# Patient Record
Sex: MALE | Race: WHITE | NOT HISPANIC OR LATINO | Employment: OTHER | URBAN - NONMETROPOLITAN AREA
[De-identification: names, ages, dates, MRNs, and addresses within clinical notes are randomized per-mention and may not be internally consistent; named-entity substitution may affect disease eponyms.]

---

## 2019-01-23 ENCOUNTER — TELEPHONE (OUTPATIENT)
Dept: OTOLARYNGOLOGY | Facility: CLINIC | Age: 84
End: 2019-01-23

## 2019-02-18 ENCOUNTER — OFFICE VISIT (OUTPATIENT)
Dept: OTOLARYNGOLOGY | Facility: CLINIC | Age: 84
End: 2019-02-18

## 2019-02-18 VITALS
RESPIRATION RATE: 20 BRPM | SYSTOLIC BLOOD PRESSURE: 139 MMHG | HEIGHT: 69 IN | WEIGHT: 241 LBS | HEART RATE: 74 BPM | DIASTOLIC BLOOD PRESSURE: 76 MMHG | TEMPERATURE: 98.2 F | BODY MASS INDEX: 35.7 KG/M2

## 2019-02-18 DIAGNOSIS — H92.03 OTALGIA OF BOTH EARS: ICD-10-CM

## 2019-02-18 DIAGNOSIS — H91.93 BILATERAL HEARING LOSS, UNSPECIFIED HEARING LOSS TYPE: ICD-10-CM

## 2019-02-18 DIAGNOSIS — R59.0 LYMPHADENOPATHY OF RIGHT CERVICAL REGION: Primary | ICD-10-CM

## 2019-02-18 DIAGNOSIS — Z85.820 HISTORY OF MALIGNANT MELANOMA: ICD-10-CM

## 2019-02-18 PROCEDURE — 99203 OFFICE O/P NEW LOW 30 MIN: CPT | Performed by: OTOLARYNGOLOGY

## 2019-02-18 RX ORDER — ESOMEPRAZOLE MAGNESIUM 40 MG/1
40 CAPSULE, DELAYED RELEASE ORAL DAILY
COMMUNITY

## 2019-02-18 NOTE — PROGRESS NOTES
PRIMARY CARE PROVIDER: Provider, No Known  REFERRING PROVIDER: No ref. provider found    Chief Complaint   Patient presents with   • Neck Pain     Swollen Lymph Node Right Neck       Subjective   History of Present Illness:  Miguelangel Espinal is a  83 y.o. male who presents with complaints of a swollen lymph node in his right neck.  This is been present for 6-8 months.  He reports paroxysmal, mild pain in the area.  He also has a long-standing history of otalgia and decreased hearing.  He does have a 20 pound weight loss over the past year, but attributes this to knee surgery.  He has a history of melanoma removed from his back 6-8 months ago.  He is also had skin cancers removed from the right preauricular area and from his forehead and scalp.  Nothing is made his lymph node better or worse.      Review of Systems:  Review of Systems   Constitutional: Positive for unexpected weight change. Negative for chills and fever.   HENT: Positive for ear pain and hearing loss. Negative for facial swelling.    Musculoskeletal: Positive for neck pain (cervical disease).   Skin: Negative for color change and wound.   Neurological: Negative for facial asymmetry and headaches.   Hematological: Positive for adenopathy. Does not bruise/bleed easily.   Psychiatric/Behavioral: Negative for agitation.       Past History:  Past Medical History:   Diagnosis Date   • BCC (basal cell carcinoma)     scalp   • Melanoma (CMS/HCC)     back     Past Surgical History:   Procedure Laterality Date   • GALLBLADDER SURGERY     • REPLACEMENT TOTAL KNEE BILATERAL     • SKIN CANCER EXCISION      scalp   • SKIN CANCER EXCISION      melanoma back     History reviewed. No pertinent family history.  Social History     Tobacco Use   • Smoking status: Never Smoker   • Smokeless tobacco: Never Used   Substance Use Topics   • Alcohol use: No     Frequency: Never   • Drug use: Not on file     Allergies:  Cephalexin; Penicillin g; and Tetracycline    Current  Outpatient Medications:   •  esomeprazole (NEXIUM) 40 MG capsule, Take 40 mg by mouth Daily., Disp: , Rfl:       Objective     Vital Signs:  Temp:  [98.2 °F (36.8 °C)] 98.2 °F (36.8 °C)  Heart Rate:  [74] 74  Resp:  [20] 20  BP: (139)/(76) 139/76    Physical Exam:  Physical Exam   Constitutional: He is oriented to person, place, and time. He appears well-developed and well-nourished. He is cooperative. No distress.   HENT:   Head: Normocephalic and atraumatic.       Right Ear: External ear normal.   Left Ear: External ear normal.   Nose: Nose normal.   Mouth/Throat: Oropharynx is clear and moist.   Eyes: Conjunctivae and EOM are normal. Pupils are equal, round, and reactive to light. Right eye exhibits no discharge. Left eye exhibits no discharge. No scleral icterus.   Neck: Normal range of motion. Neck supple. No JVD present. No tracheal deviation present. No thyromegaly present.   Pulmonary/Chest: Effort normal. No stridor.   Musculoskeletal: Normal range of motion. He exhibits no edema or deformity.   Lymphadenopathy:     He has no cervical adenopathy.   Neurological: He is alert and oriented to person, place, and time. He has normal strength. No cranial nerve deficit. Coordination normal.   Skin: Skin is warm and dry. No rash noted. He is not diaphoretic. No erythema. No pallor.   Psychiatric: He has a normal mood and affect. His speech is normal and behavior is normal. Judgment and thought content normal. Cognition and memory are normal.   Nursing note and vitals reviewed.      Results Review:   According to Dr. 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 Lymphadenopathy of right cervical region    2. History of malignant melanoma    3. Otalgia of both ears    4. Bilateral hearing loss, unspecified hearing loss type        Plan   Plan:        We will proceed with an ultrasound-guided fine-needle aspiration.  Should this demonstrate melanoma, we will likely  proceed with a PET scan.  Follow-up in approximately 2 weeks to review the results.    Return in about 2 weeks (around 3/4/2019).    My findings and recommendations were discussed and questions were answered.     Ruslan Franco MD  02/18/19  2:07 PM

## 2019-02-27 DIAGNOSIS — R59.1 LYMPHADENOPATHY OF HEAD AND NECK: Primary | ICD-10-CM

## 2019-03-05 ENCOUNTER — HOSPITAL ENCOUNTER (OUTPATIENT)
Dept: ULTRASOUND IMAGING | Facility: HOSPITAL | Age: 84
Discharge: HOME | End: 2019-03-05
Admitting: OTOLARYNGOLOGY

## 2019-03-05 DIAGNOSIS — R59.1 LYMPHADENOPATHY OF HEAD AND NECK: ICD-10-CM

## 2019-03-05 PROCEDURE — 76536 US EXAM OF HEAD AND NECK: CPT

## 2019-03-13 ENCOUNTER — OFFICE VISIT (OUTPATIENT)
Dept: OTOLARYNGOLOGY | Facility: CLINIC | Age: 84
End: 2019-03-13

## 2019-03-13 VITALS
HEART RATE: 67 BPM | TEMPERATURE: 98 F | SYSTOLIC BLOOD PRESSURE: 145 MMHG | DIASTOLIC BLOOD PRESSURE: 80 MMHG | RESPIRATION RATE: 20 BRPM | BODY MASS INDEX: 35.7 KG/M2 | WEIGHT: 241 LBS | HEIGHT: 69 IN

## 2019-03-13 DIAGNOSIS — Z85.820 HISTORY OF MALIGNANT MELANOMA: ICD-10-CM

## 2019-03-13 DIAGNOSIS — H91.93 BILATERAL HEARING LOSS, UNSPECIFIED HEARING LOSS TYPE: ICD-10-CM

## 2019-03-13 DIAGNOSIS — H92.03 OTALGIA OF BOTH EARS: ICD-10-CM

## 2019-03-13 DIAGNOSIS — D17.0 LIPOMA OF NECK: Primary | ICD-10-CM

## 2019-03-13 PROCEDURE — 99213 OFFICE O/P EST LOW 20 MIN: CPT | Performed by: OTOLARYNGOLOGY

## 2019-03-13 NOTE — PROGRESS NOTES
PRIMARY CARE PROVIDER: Provider, No Known  REFERRING PROVIDER: No ref. provider found    Chief Complaint   Patient presents with   • Follow-up     FNA results       Subjective   History of Present Illness:  Miguelangel Espinal is a  83 y.o. male who is here to follow-up from complaints of a swollen lymph node in his right neck.  This has been present for 6-8 months.  He reports paroxysmal, mild pain in the area.  He also has a long-standing history of otalgia and decreased hearing.  He does have a 20 pound weight loss over the past year, but attributes this to knee surgery.  He has a history of melanoma removed from his back 6-8 months ago.  He is also had skin cancers removed from the right preauricular area and from his forehead and scalp.  Nothing is made his lymph node better or worse. An US guided FNA of the lymph node was ordered. However, the radiologist felt this was a lipoma and a biopsy was not performed.     Review of Systems:  Review of Systems   Constitutional: Positive for unexpected weight change. Negative for chills and fever.   HENT: Positive for ear pain and hearing loss. Negative for facial swelling.    Musculoskeletal: Positive for neck pain (cervical disease).   Skin: Negative for color change and wound.   Neurological: Negative for facial asymmetry and headaches.   Hematological: Positive for adenopathy. Does not bruise/bleed easily.   Psychiatric/Behavioral: Negative for agitation.       Past History:  Past Medical History:   Diagnosis Date   • BCC (basal cell carcinoma)     scalp   • Melanoma (CMS/HCC)     back     Past Surgical History:   Procedure Laterality Date   • GALLBLADDER SURGERY     • REPLACEMENT TOTAL KNEE BILATERAL     • SKIN CANCER EXCISION      scalp   • SKIN CANCER EXCISION      melanoma back     History reviewed. No pertinent family history.  Social History     Tobacco Use   • Smoking status: Never Smoker   • Smokeless tobacco: Never Used   Substance Use Topics   • Alcohol use: No      Frequency: Never   • Drug use: Not on file     Allergies:  Cephalexin; Penicillin g; and Tetracycline    Current Outpatient Medications:   •  esomeprazole (NEXIUM) 40 MG capsule, Take 40 mg by mouth Daily., Disp: , Rfl:       Objective     Vital Signs:  Temp:  [98 °F (36.7 °C)] 98 °F (36.7 °C)  Heart Rate:  [67] 67  Resp:  [20] 20  BP: (145)/(80) 145/80    Physical Exam:  Physical Exam   Constitutional: He is oriented to person, place, and time. He appears well-developed and well-nourished. He is cooperative. No distress.   HENT:   Head: Normocephalic and atraumatic.       Right Ear: External ear normal.   Left Ear: External ear normal.   Nose: Nose normal.   Mouth/Throat: Oropharynx is clear and moist.   Eyes: Conjunctivae and EOM are normal. Pupils are equal, round, and reactive to light. Right eye exhibits no discharge. Left eye exhibits no discharge. No scleral icterus.   Neck: Normal range of motion. Neck supple. No JVD present. No tracheal deviation present. No thyromegaly present.   Pulmonary/Chest: Effort normal. No stridor.   Musculoskeletal: Normal range of motion. He exhibits no edema or deformity.   Lymphadenopathy:     He has no cervical adenopathy.   Neurological: He is alert and oriented to person, place, and time. He has normal strength. No cranial nerve deficit. Coordination normal.   Skin: Skin is warm and dry. No rash noted. He is not diaphoretic. No erythema. No pallor.   Psychiatric: He has a normal mood and affect. His speech is normal and behavior is normal. Judgment and thought content normal. Cognition and memory are normal.   Nursing note and vitals reviewed.      Results Review:   According to Dr. 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    Assessment   Assessment:  1. Lipoma of neck    2. History of malignant melanoma    3. Otalgia of both ears    4. Bilateral  hearing loss, unspecified hearing loss type        Plan   Plan:    During US examination, this was felt to be a lipoma which is benign. There is no need to proceed with FNA.  He agrees with this plan.  I have offered to follow-up with him to monitor this area. He prefers to follow-up as needed. He was instructed to call or return should he notice any changes, especially enlargement. The patient has agreed and verbalized understanding.     QUALITY MEASURES    Body Mass Index Screening and Follow-Up Plan  Body mass index is 35.59 kg/m².  Patient's Body mass index is 35.59 kg/m². BMI is above normal parameters. Recommendations include: referral to primary care.    Tobacco Use: Screening and Cessation Intervention  Social History    Tobacco Use      Smoking status: Never Smoker      Smokeless tobacco: Never Used      Return if symptoms worsen or fail to improve, for Recheck.    My findings and recommendations were discussed and questions were answered.     Ruslan Franco MD  03/13/19  3:15 PM

## 2023-08-21 ENCOUNTER — OFFICE VISIT (OUTPATIENT)
Dept: OTOLARYNGOLOGY | Facility: CLINIC | Age: 88
End: 2023-08-21
Payer: MEDICARE

## 2023-08-21 VITALS
HEIGHT: 69 IN | BODY MASS INDEX: 31.1 KG/M2 | SYSTOLIC BLOOD PRESSURE: 144 MMHG | TEMPERATURE: 98 F | RESPIRATION RATE: 20 BRPM | WEIGHT: 210 LBS | HEART RATE: 74 BPM | DIASTOLIC BLOOD PRESSURE: 73 MMHG

## 2023-08-21 DIAGNOSIS — R22.1 NECK MASS: ICD-10-CM

## 2023-08-21 DIAGNOSIS — D17.0 LIPOMA OF NECK: Primary | ICD-10-CM

## 2023-08-21 PROCEDURE — 99203 OFFICE O/P NEW LOW 30 MIN: CPT | Performed by: OTOLARYNGOLOGY

## 2023-08-21 RX ORDER — AMLODIPINE BESYLATE 5 MG/1
5 TABLET ORAL DAILY
COMMUNITY
Start: 2023-07-18

## 2023-08-21 NOTE — PROGRESS NOTES
PRIMARY CARE PROVIDER: Kendrick Christina MD  REFERRING PROVIDER: No ref. provider found    Chief Complaint   Patient presents with    Skin Lesion     Lymph nodes right        Subjective   History of Present Illness:  Miguelangel Espinal is a  87 y.o. male who is here for reevaluation of a swollen lymph node in his right neck.  This has been present for 4 years and has not changed.  He reports paroxysmal, mild pain in the area.  He has a history of melanoma removed from his back 4.5 years ago.  He also had skin cancers removed from the right preauricular area and from his forehead and scalp.  Nothing is made his lymph node better or worse. An US guided FNA of the lymph node was ordered. However, the radiologist felt this was a lipoma and a biopsy was not performed. Dr. Schmid would like this mass evaluated.  He reports no change in the lesion over the past 4 years.    Review of Systems:  Review of Systems   Constitutional:  Positive for unexpected weight change. Negative for chills and fever.   HENT:  Positive for ear pain and hearing loss. Negative for facial swelling.    Musculoskeletal:  Positive for neck pain (cervical disease).   Skin:  Negative for color change and wound.   Neurological:  Negative for facial asymmetry and headaches.   Hematological:  Positive for adenopathy. Does not bruise/bleed easily.   Psychiatric/Behavioral:  Negative for agitation.      Past History:  Past Medical History:   Diagnosis Date    BCC (basal cell carcinoma)     scalp    Melanoma     back     Past Surgical History:   Procedure Laterality Date    GALLBLADDER SURGERY      REPLACEMENT TOTAL KNEE BILATERAL      SKIN CANCER EXCISION      scalp    SKIN CANCER EXCISION      melanoma back     History reviewed. No pertinent family history.  Social History     Tobacco Use    Smoking status: Never    Smokeless tobacco: Never   Substance Use Topics    Alcohol use: No     Allergies:  Cephalexin, Penicillin g, and Tetracycline    Current  Outpatient Medications:     amLODIPine (NORVASC) 5 MG tablet, Take 1 tablet by mouth Daily., Disp: , Rfl:     esomeprazole (nexIUM) 40 MG capsule, Take 1 capsule by mouth Daily., Disp: , Rfl:       Objective     Vital Signs:  Temp:  [98 øF (36.7 øC)] 98 øF (36.7 øC)  Heart Rate:  [74] 74  Resp:  [20] 20  BP: (144)/(73) 144/73    Physical Exam:  Physical Exam   Constitutional: He is oriented to person, place, and time. He appears well-developed. He is cooperative. No distress.   HENT:   Head: Normocephalic and atraumatic.       Right Ear: External ear normal.   Left Ear: External ear normal.   Nose: Nose normal.   Eyes: Pupils are equal, round, and reactive to light. Conjunctivae are normal. Right eye exhibits no discharge. Left eye exhibits no discharge. No scleral icterus.   Neck: No JVD present. No tracheal deviation present. No thyromegaly present.   Pulmonary/Chest: Effort normal. No stridor.   Musculoskeletal: Normal range of motion. No deformity.   Lymphadenopathy:     He has no cervical adenopathy.   Neurological: He is alert and oriented to person, place, and time. No cranial nerve deficit. Coordination normal.   Skin: Skin is warm and dry. No rash noted. He is not diaphoretic. No erythema. No pallor.   Psychiatric: His speech is normal and behavior is normal. Judgment and thought content normal.   Nursing note and vitals reviewed.    Results Review:   According to Dr. 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    Assessment   Assessment:  1. Lipoma of neck    2. Neck mass          Plan   Plan:    I would like to proceed with a CT scan of the neck to further evaluate.  This does not palpate like a lipoma.  I would be surprised if this were melanoma in the node, as his original melanoma was in situ and was of the mid back and this mass has not changed in 4 years.    He has been seeing   Case every 6 months.    QUALITY MEASURES    Body Mass Index Screening and Follow-Up Plan  Body mass index is 31.01 kg/mý.  Patient's Body mass index is 31.01 kg/mý. BMI is above normal parameters. Recommendations include: referral to primary care.    Tobacco Use: Screening and Cessation Intervention  Social History    Tobacco Use      Smoking status: Never      Smokeless tobacco: Never      Return in about 3 weeks (around 9/11/2023).    My findings and recommendations were discussed and questions were answered.     Ruslan Franco MD  08/21/23  15:58 CDT

## 2023-08-30 DIAGNOSIS — R22.1 NECK MASS: Primary | ICD-10-CM

## 2023-09-11 ENCOUNTER — TELEPHONE (OUTPATIENT)
Dept: OTOLARYNGOLOGY | Facility: CLINIC | Age: 88
End: 2023-09-11

## 2023-09-11 NOTE — TELEPHONE ENCOUNTER
Caller: Miguelangel Espinal     Relationship: SELF    Best call back number: [phone]    What is the best time to reach you: ANYTIME     Who are you requesting to speak with (clinical staff, provider,  specific staff member): CLINICAL FOR DR. RUSH    What was the call regarding: PT CALLED ASKING IF DR. RUSH WANTED HIM TO HAVE A CT SCAN DONE. HE WOULD LIKE A CALL BACK TO CLARIFY.

## 2023-09-12 ENCOUNTER — TELEPHONE (OUTPATIENT)
Dept: OTOLARYNGOLOGY | Facility: CLINIC | Age: 88
End: 2023-09-12

## 2023-09-12 ENCOUNTER — TELEPHONE (OUTPATIENT)
Dept: OTOLARYNGOLOGY | Facility: CLINIC | Age: 88
End: 2023-09-12
Payer: MEDICARE

## 2023-09-12 NOTE — TELEPHONE ENCOUNTER
This nurse called to inform him that I faxed over the CT order and that they will have to call the hospital to schedule and that they will need to bring a disk for the provider to look at it.  The son voiced understanding.

## 2023-09-12 NOTE — TELEPHONE ENCOUNTER
Hub staff attempted to follow warm transfer process and was unsuccessful     Caller: KIMBERLEY ONOFRE JR    Relationship to patient: SON    Best call back number: [phone]    Patient is needing: PT SON NEEDS TO R/S PT'S APPT THAT IS FOR TOMORROW SINCE HIS CT SCAN IS SCHEDULED FOR 9/18, PLEASE CALL BACK PT SON TO RESCHEDULE SO HIS APPT IS AFTER HIS SCHEDULED CT SCAN.

## 2023-09-13 ENCOUNTER — TELEPHONE (OUTPATIENT)
Dept: OTOLARYNGOLOGY | Facility: CLINIC | Age: 88
End: 2023-09-13

## 2023-09-19 ENCOUNTER — TELEPHONE (OUTPATIENT)
Dept: OTOLARYNGOLOGY | Facility: CLINIC | Age: 88
End: 2023-09-19
Payer: MEDICARE

## 2023-09-19 ENCOUNTER — TELEPHONE (OUTPATIENT)
Dept: OTOLARYNGOLOGY | Facility: CLINIC | Age: 88
End: 2023-09-19

## 2023-09-19 NOTE — TELEPHONE ENCOUNTER
[facility] received a fax that requires your attention. The document has been indexed to the patient’s chart for your review.      Reason for sending: RCVD POC CREATININE, REQUIRES PROVIDER REVIEW    Documents Description: LABS-[facility]-09.18.23    Name of Sender: [facility]    Date Indexed: 09.19.23    Notes (if needed):

## 2023-09-19 NOTE — TELEPHONE ENCOUNTER
Provider: DR. POPEYE RUSH    [facility] received a fax that requires your attention. The document has been indexed to the patient's chart for your review.   Reason for sending: REVIEW   Documents Description: CT NECK WITH CONTRAST 9/18/23   Name of Sender: [facility]   Date Indexed: 09/19/23

## 2023-09-25 ENCOUNTER — OFFICE VISIT (OUTPATIENT)
Dept: OTOLARYNGOLOGY | Facility: CLINIC | Age: 88
End: 2023-09-25

## 2023-09-25 VITALS
WEIGHT: 210 LBS | RESPIRATION RATE: 20 BRPM | DIASTOLIC BLOOD PRESSURE: 82 MMHG | HEIGHT: 69 IN | TEMPERATURE: 98 F | HEART RATE: 85 BPM | BODY MASS INDEX: 31.1 KG/M2 | SYSTOLIC BLOOD PRESSURE: 136 MMHG

## 2023-09-25 DIAGNOSIS — H61.23 BILATERAL IMPACTED CERUMEN: ICD-10-CM

## 2023-09-25 DIAGNOSIS — D17.0 LIPOMA OF NECK: ICD-10-CM

## 2023-09-25 DIAGNOSIS — R22.1 NECK MASS: Primary | ICD-10-CM

## 2023-09-25 NOTE — PROGRESS NOTES
PRIMARY CARE PROVIDER: Kendrick Christina MD  REFERRING PROVIDER: No ref. provider found    Chief Complaint   Patient presents with    Follow-up     CT results       Subjective   History of Present Illness:  Miguelangel Espinal is a  88 y.o. male who return for CT scan review regarding a swollen lymph node in his right neck.  This has been present for 4 years and has not changed.  He reports paroxysmal, mild pain in the area.  He has a history of melanoma removed from his back 4.5 years ago.  He also had skin cancers removed from the right preauricular area and from his forehead and scalp.  Nothing is made his lymph node better or worse. An US guided FNA of the lymph node was ordered. However, the radiologist felt this was a lipoma and a biopsy was not performed. Dr. Schmid would like this mass evaluated.  He reports no change in the lesion over the past 4 years.    He also reports some pain posterior to the left ear that radiates up his scalp on the left.    Review of Systems:  Review of Systems   Constitutional:  Positive for unexpected weight change. Negative for chills and fever.   HENT:  Positive for ear pain and hearing loss. Negative for facial swelling.    Musculoskeletal:  Positive for neck pain (cervical disease).   Skin:  Negative for color change and wound.   Neurological:  Negative for facial asymmetry and headaches.   Hematological:  Positive for adenopathy. Does not bruise/bleed easily.   Psychiatric/Behavioral:  Negative for agitation.      Past History:  Past Medical History:   Diagnosis Date    BCC (basal cell carcinoma)     scalp    Melanoma     back     Past Surgical History:   Procedure Laterality Date    GALLBLADDER SURGERY      REPLACEMENT TOTAL KNEE BILATERAL      SKIN CANCER EXCISION      scalp    SKIN CANCER EXCISION      melanoma back     History reviewed. No pertinent family history.  Social History     Tobacco Use    Smoking status: Never    Smokeless tobacco: Never   Substance Use  Topics    Alcohol use: No     Allergies:  Cephalexin, Penicillin g, and Tetracycline    Current Outpatient Medications:     amLODIPine (NORVASC) 5 MG tablet, Take 1 tablet by mouth Daily., Disp: , Rfl:     esomeprazole (nexIUM) 40 MG capsule, Take 1 capsule by mouth Daily., Disp: , Rfl:       Objective     Vital Signs:  Temp:  [98 °F (36.7 °C)] 98 °F (36.7 °C)  Heart Rate:  [85] 85  Resp:  [20] 20  BP: (136)/(82) 136/82    Physical Exam:  Physical Exam   Constitutional: He is oriented to person, place, and time. He appears well-developed. He is cooperative. No distress.   HENT:   Head: Normocephalic and atraumatic.       Right Ear: External ear normal. Decreased hearing is noted.   Left Ear: Decreased hearing is noted.   Nose: Nose normal.   Mouth/Throat: Mucous membranes are moist. He has dentures (Upper). No oral lesions. No tonsillar exudate. Oropharynx is clear.   Eyes: Pupils are equal, round, and reactive to light. Conjunctivae are normal. Right eye exhibits no discharge. Left eye exhibits no discharge. No scleral icterus.   Neck: No JVD present. No tracheal deviation present. No thyromegaly present.   Pulmonary/Chest: Effort normal. No stridor.   Musculoskeletal: Normal range of motion. No deformity.   Lymphadenopathy:     He has no cervical adenopathy.   Neurological: He is alert and oriented to person, place, and time. No cranial nerve deficit. Coordination normal.   Skin: Skin is warm and dry. No rash noted. He is not diaphoretic. No erythema. No pallor.   Psychiatric: His speech is normal and behavior is normal. Judgment and thought content normal.   Nursing note and vitals reviewed.    Results Review:   According to Dr. 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    I personally reviewed the CT scan images on disc.  The following is my interpretation: There is a fiducial overlying the  area of question.  I do not see anything suspicious for a lymph node in this area.  I feel this most likely represents a lipoma:        Assessment   Assessment:  1. Neck mass    2. Lipoma of neck    3. Bilateral impacted cerumen            Plan   Plan:    I discussed observation versus excision of the right neck mass for full pathological analysis.  He is accompanied by his son, who is also present serving as a historian and decision maker.  They are okay going ahead and scheduling this for excision.  We will schedule this for the morning time, should this represent a lymph node that needs to be sent for flow studies.    He had bilateral cerumen impaction.  This was removed today in the office.    QUALITY MEASURES    Body Mass Index Screening and Follow-Up Plan  Body mass index is 31.01 kg/m².  Patient's Body mass index is 31.01 kg/m². BMI is above normal parameters. Recommendations include: referral to primary care.    Tobacco Use: Screening and Cessation Intervention  Social History    Tobacco Use      Smoking status: Never      Smokeless tobacco: Never      No follow-ups on file.    My findings and recommendations were discussed and questions were answered.     Ruslan Franco MD  09/25/23  16:45 CDT

## 2023-09-25 NOTE — PROGRESS NOTES
PATIENT NAME:  Miguelangel Espinal    DATE:  09/25/23    PREOPERATIVE DIAGNOSIS:  Cerumen impaction, bilateral    POSTOPERATIVE DIAGNOSIS:  Cerumen impaction, bilateral    PROCEDURE: Binocular microscopy with cerumen removal, lateral    SURGEON:  Ruslan Franco MD, FACS    FACILITY: [facility] Office Procedure Room    ANESTHESIA:  None    DICTATED BY:  Ruslan Franco MD, FACS    IVF: None    IMPLANTS: None    DRAINS: None    SPECIMENS: None    EBL: None    COMPLICATIONS: None    INDICATIONS FOR SURGERY: The patient presented with a cerumen impaction    OPERATIVE FINDINGS: After removal of the impacted cerumen, the bilateral tympanic membranes appeared healthy without evidence of effusion or tympanic membrane perforation.    OPERATIVE DETAILS: The patient sat in the procedure room chair.  The binocular microscope was used to visualize the right external auditory canal.   Cerumen was removed utilizing alligator forceps and a 7 Caes suction.   Visualization demonstrated an intact tympanic membrane    An Identical procedure was performed on the left.  Visualization demonstrated impacted cerumen.  After removal, the tympanic membrane was visualized and noted to be intact without evidence of perforation or middle ear effusion.      DISPOSITION:  The procedures were completed without complication and tolerated well.  The patient was released in the company of his son to return home in satisfactory condition.  A follow-up appointment has been scheduled, routine post-op medications prescribed (if required), and post-procedure instructions were given to the responsible party.           Ruslan Franco MD, FACS  Board Certified Facial Plastic and Reconstructive Surgery  Board Certified Otolaryngology -- Head and Neck Surgery    Electronically signed by Ruslan Franco MD, 09/25/23, 4:47 PM CDT.

## 2023-11-08 ENCOUNTER — PROCEDURE VISIT (OUTPATIENT)
Age: 88
End: 2023-11-08
Payer: MEDICARE

## 2023-11-08 VITALS
HEART RATE: 76 BPM | DIASTOLIC BLOOD PRESSURE: 70 MMHG | SYSTOLIC BLOOD PRESSURE: 132 MMHG | TEMPERATURE: 98 F | HEIGHT: 69 IN | RESPIRATION RATE: 20 BRPM | BODY MASS INDEX: 31.1 KG/M2 | WEIGHT: 210 LBS

## 2023-11-08 DIAGNOSIS — D17.0 LIPOMA OF NECK: Primary | ICD-10-CM

## 2023-11-08 NOTE — PROGRESS NOTES
PATIENT NAME:  Miguelangel Espinal    DATE: 11/08/23    PREOPERATIVE DIAGNOSIS: Mass of right lateral neck    POSTOPERATIVE DIAGNOSIS: Lipoma of right lateral neck    PROCEDURE: Excision of lipoma, 1.0 centimeters, subcutaneous    SURGEON:  Ruslan Franco MD, FACS    FACILITY: [facility] Office Procedure Room    ANESTHESIA:  Local with 2 cc 1% lidocaine and 1:100,000 epinephrine    DICTATED BY:  Ruslan Franco MD, FACS    IVF: None    IMPLANTS: None    DRAINS: None    SPECIMENS: Lipoma    EBL: 20 cc    COMPLICATIONS: None apparent    INDICATIONS FOR SURGERY: Mr. Espinal presented with a mass of the right neck.  He was referred by his oncologist, who wanted definitive diagnosis.  Clinically, this was suspicious for a lipoma.  The patient opted for excision for removal and pathological diagnosis.    OPERATIVE FINDINGS: The palpable mass demonstrated an immediate-subcutaneous lipoma.  This measured 1.0 cm, but was removed with some normal-appearing fat.    OPERATIVE DETAILS:       After patient verification consent material was reviewed, the patient was taken to the procedure room and laid supine on the procedure table.  The skin at the area was cleansed with alcohol, the area marked, the patient was then handed a mirror where we reviewed the intended excision, and verified the consent.  This served as the formal timeout procedure.  The skin was  infiltrated with 1% lidocaine with 1-100,000 epinephrine.    After approximately 15 minutes, the skin was tested for anesthesia and deemed appropriate.  The skin was cleansed with Hibiclense and the patient was draped with sterile towels.    #15 blade was used to make a linear incision overlying the lipoma.  The skin was retracted with skin hooks and dissection was carried into the subcutaneous tissue utilizing curved iris scissors and Bovie cautery set at 12.   The mass was identified and noted to be compatible with a lipoma.  This was removed with some  normal-palpating fat from the deep aspect.  Hemostasis was obtained with bipolar cautery set at 15.    The dermis was closed utilizing 4-0 undyed Vicryl in buried fashion.  The skin was then closed utilizing running, locking 5-0 Prolene    DISPOSITION:  The procedures were completed without complication and tolerated well.  The patient was released in the company of himself to return home in satisfactory condition.  A follow-up appointment has been scheduled, routine post-op medications prescribed (if required), and post-op instructions were given to the responsible party.           Ruslan Franco MD, FACS  Board Certified Facial Plastic and Reconstructive Surgery  Board Certified Otolaryngology -- Head and Neck     Electronically signed by Ruslan Franco MD, 11/08/23, 9:40 AM CST.

## 2023-11-08 NOTE — PATIENT INSTRUCTIONS
[facility], Post-Procedure Instructions:    Protect the incisions from sunlight. Sunlight to the incisions will cause permanent pigmentation to the incision line and make the incision more noticeable. After the incision has reepithelialized (typically 2-3 weeks after the procedure), you may begin to use sunscreen with an SPF of 15 or greater    For the first week after your procedure, apply a thin coat of Vaseline to the incision 3-4 times daily.  Starting the second week, use a silicone-based wound cream to the incisions to optimize the end result. Apply topically twice daily, or as directed, to help optimize wound healing and decrease redness.  Should the area need to be cleaned, gently apply peroxide on a Q-tip to the area.  Do not clean the area more than once daily with peroxide, as it can delay wound healing.    Avoid getting water on the surgical site for 3 days.  On day 4, you may briefly get the surgical site with clean water, but avoid soapy water to the area for 1 week.      Due to COVID-19, we have decreased the amount of postoperative checks to help prevent added exposure to the virus.  If you have any questions or concerns following her procedure, please give us a call.  We have the ability to schedule a video visit, phone visit, or follow-up visit to address any concerns, while decreasing your exposure to the hospital.  Many of your questions and concerns may be able to be answered over the phone.  Our direct line is [phone].    It is very important to continue routine skin checks with a dermatologist or your PCP every 6-12 months.        CONTACT INFORMATION:  The main office phone number is [phone]. For emergencies after hours and on weekends, this number will convert over to our answering service and the on call provider will answer. Please try to keep non emergent phone calls/ questions to office hours 9am-5pm Monday through Friday.     [facility]  As an alternative, you can  sign up and use the Epic MyChart system for more direct and quicker access for non emergent questions/ problems.  [facility] allows you to send messages to your doctor, view your test results, renew your prescriptions, schedule appointments, and more. To sign up, go to [email or web address].    If you have questions, you can email [email or web address] or call [phone] to talk to our [employer] staff. Remember, [employer] is NOT to be used for urgent needs. For medical emergencies, dial 911.

## 2023-11-13 LAB
CYTO UR: NORMAL
LAB AP CASE REPORT: NORMAL
LAB AP CLINICAL INFORMATION: NORMAL
Lab: NORMAL
PATH REPORT.FINAL DX SPEC: NORMAL
PATH REPORT.GROSS SPEC: NORMAL

## 2023-11-14 ENCOUNTER — OFFICE VISIT (OUTPATIENT)
Age: 88
End: 2023-11-14
Payer: MEDICARE

## 2023-11-14 VITALS
BODY MASS INDEX: 31.1 KG/M2 | WEIGHT: 210 LBS | HEIGHT: 69 IN | RESPIRATION RATE: 20 BRPM | DIASTOLIC BLOOD PRESSURE: 80 MMHG | TEMPERATURE: 98 F | HEART RATE: 85 BPM | SYSTOLIC BLOOD PRESSURE: 142 MMHG

## 2023-11-14 DIAGNOSIS — D17.30 FIBROLIPOMA OF SKIN: Primary | ICD-10-CM

## 2023-11-14 PROCEDURE — 1160F RVW MEDS BY RX/DR IN RCRD: CPT | Performed by: NURSE PRACTITIONER

## 2023-11-14 PROCEDURE — 99024 POSTOP FOLLOW-UP VISIT: CPT | Performed by: NURSE PRACTITIONER

## 2023-11-14 PROCEDURE — 1159F MED LIST DOCD IN RCRD: CPT | Performed by: NURSE PRACTITIONER

## 2023-11-14 PROCEDURE — 87147 CULTURE TYPE IMMUNOLOGIC: CPT | Performed by: NURSE PRACTITIONER

## 2023-11-14 PROCEDURE — 87186 SC STD MICRODIL/AGAR DIL: CPT | Performed by: NURSE PRACTITIONER

## 2023-11-14 PROCEDURE — 87205 SMEAR GRAM STAIN: CPT | Performed by: NURSE PRACTITIONER

## 2023-11-14 PROCEDURE — 87070 CULTURE OTHR SPECIMN AEROBIC: CPT | Performed by: NURSE PRACTITIONER

## 2023-11-14 RX ORDER — CLINDAMYCIN HYDROCHLORIDE 300 MG/1
300 CAPSULE ORAL 3 TIMES DAILY
Qty: 30 CAPSULE | Refills: 0 | Status: SHIPPED | OUTPATIENT
Start: 2023-11-14 | End: 2023-11-24

## 2023-11-14 NOTE — PROGRESS NOTES
CAMRYN Mae     PRIMARY CARE PROVIDER: Kendrick Christina MD  REFERRING PROVIDER: No ref. provider found    Chief Complaint   Patient presents with    Post-op     Suture removal post-op visit       Subjective   History of Present Illness:  Miguelangel Espinal is a  88 y.o. male  who presents for follow up s/p excision of a lipoma of the right lateral neck on 11/8/2023.  Postoperatively, he has had some tenderness to the surgical site.  He denies fever, chills, bleeding, or drainage.      Review of Systems:  Review of Systems   Constitutional:  Positive for unexpected weight change. Negative for chills and fever.   HENT:  Positive for ear pain and hearing loss. Negative for facial swelling.    Musculoskeletal:  Positive for neck pain (cervical disease).   Skin:  Negative for color change and wound.   Neurological:  Negative for facial asymmetry and headaches.   Hematological:  Positive for adenopathy. Does not bruise/bleed easily.   Psychiatric/Behavioral:  Negative for agitation.        Past History:  Past Medical History:   Diagnosis Date    BCC (basal cell carcinoma)     scalp    Melanoma     back     Past Surgical History:   Procedure Laterality Date    GALLBLADDER SURGERY      REPLACEMENT TOTAL KNEE BILATERAL      SKIN CANCER EXCISION      scalp    SKIN CANCER EXCISION      melanoma back     History reviewed. No pertinent family history.  Social History     Tobacco Use    Smoking status: Never    Smokeless tobacco: Never   Substance Use Topics    Alcohol use: No     Allergies:  Cephalexin, Penicillin g, and Tetracycline    Current Outpatient Medications:     amLODIPine (NORVASC) 5 MG tablet, Take 1 tablet by mouth Daily., Disp: , Rfl:     esomeprazole (nexIUM) 40 MG capsule, Take 1 capsule by mouth Daily., Disp: , Rfl:     clindamycin (CLEOCIN) 300 MG capsule, Take 1 capsule by mouth 3 (Three) Times a Day for 10 days., Disp: 30 capsule, Rfl: 0    mupirocin (BACTROBAN) 2 % ointment, Apply 1 application   topically to the appropriate area as directed 3 (Three) Times a Day for 7 days., Disp: 22 g, Rfl: 0      Objective     Vital Signs:  Temp:  [98 °F (36.7 °C)] 98 °F (36.7 °C)  Heart Rate:  [85] 85  Resp:  [20] 20  BP: (142)/(80) 142/80    Physical Exam:  Physical Exam  Vitals reviewed.   Constitutional:       General: He is not in acute distress.     Appearance: He is well-developed.   HENT:      Head: Normocephalic and atraumatic.      Right Ear: External ear normal.      Left Ear: External ear normal.      Mouth/Throat:      Lips: Pink.   Eyes:      General: Lids are normal.   Neck:     Pulmonary:      Effort: Pulmonary effort is normal. No respiratory distress.      Breath sounds: No stridor.   Musculoskeletal:      Cervical back: Neck supple.   Neurological:      Mental Status: He is alert and oriented to person, place, and time.   Psychiatric:         Mood and Affect: Mood normal.         Speech: Speech normal.         Behavior: Behavior normal. Behavior is cooperative.         Results Review:       Assessment   Assessment:  1. Fibrolipoma of skin        Plan   Plan:    Culture was obtained.  We will go ahead and start him on Bactroban and clindamycin.  He was instructed to call return should any problems arise prior to next office visit.      New Medications Ordered This Visit   Medications    mupirocin (BACTROBAN) 2 % ointment     Sig: Apply 1 application  topically to the appropriate area as directed 3 (Three) Times a Day for 7 days.     Dispense:  22 g     Refill:  0    clindamycin (CLEOCIN) 300 MG capsule     Sig: Take 1 capsule by mouth 3 (Three) Times a Day for 10 days.     Dispense:  30 capsule     Refill:  0     There are no Patient Instructions on file for this visit.  Return in about 4 weeks (around 12/12/2023), or if symptoms worsen or fail to improve, for Recheck.    My findings and recommendations were discussed and questions were answered.     Crystal Zambrano, APRN  11/14/23  13:02 CST

## 2023-11-17 LAB
BACTERIA SPEC AEROBE CULT: ABNORMAL
BACTERIA SPEC AEROBE CULT: ABNORMAL
GRAM STN SPEC: ABNORMAL
GRAM STN SPEC: ABNORMAL

## 2023-11-20 ENCOUNTER — TELEPHONE (OUTPATIENT)
Age: 88
End: 2023-11-20
Payer: MEDICARE

## 2023-11-21 RX ORDER — SULFAMETHOXAZOLE AND TRIMETHOPRIM 800; 160 MG/1; MG/1
1 TABLET ORAL 2 TIMES DAILY
Qty: 20 TABLET | Refills: 0 | Status: SHIPPED | OUTPATIENT
Start: 2023-11-21 | End: 2023-12-01

## 2023-11-21 NOTE — TELEPHONE ENCOUNTER
Pt was informed of the ABT change and is to let this nurse know if he has any issues with the new ABT.

## 2023-11-28 ENCOUNTER — TELEPHONE (OUTPATIENT)
Age: 88
End: 2023-11-28
Payer: MEDICARE

## 2023-11-28 NOTE — TELEPHONE ENCOUNTER
Returned call to pt regarding rescheduling appointment.  Pt voiced understanding of 12/18 appointment

## 2023-11-28 NOTE — TELEPHONE ENCOUNTER
Hub staff attempted to follow warm transfer process and was unsuccessful     Caller: KIMBERLEY ONOFRE    Relationship to patient: SELF    Best call back number: [phone]     Patient is needing: PT WAS RETURNING A CALL - APPEARS TO BE ABOUT RESCHEDULING APPT, BUT NO TE IN CHART. PLEASE CALL HIM BACK.

## 2023-12-18 ENCOUNTER — OFFICE VISIT (OUTPATIENT)
Age: 88
End: 2023-12-18
Payer: MEDICARE

## 2023-12-18 VITALS — DIASTOLIC BLOOD PRESSURE: 77 MMHG | HEART RATE: 80 BPM | TEMPERATURE: 97.6 F | SYSTOLIC BLOOD PRESSURE: 134 MMHG

## 2023-12-18 DIAGNOSIS — D17.30 FIBROLIPOMA OF SKIN: Primary | ICD-10-CM

## 2023-12-18 DIAGNOSIS — T81.30XA SPITTING SUTURE, INITIAL ENCOUNTER: ICD-10-CM

## 2023-12-18 NOTE — PROGRESS NOTES
CAMRYN Mae     PRIMARY CARE PROVIDER: Kendrick Christina MD  REFERRING PROVIDER: No ref. provider found    Chief Complaint   Patient presents with    Follow-up     1 month follow up on exc of lipoma to right neck       Subjective   History of Present Illness:  Miguelangel Espinal is a  88 y.o. male  who presents for follow up s/p excision of a lipoma of the right lateral neck on 11/8/2023.  Postoperatively, he had tenderness, erythema, and a scant amount of purulence to the incision.  On 11/14/2023, a culture was obtained and revealed light growth of Staphylococcus aureus susceptible to clindamycin.  He was treated with clindamycin and mupirocin.  He denies pain, fever, chills, bleeding, or drainage.      Review of Systems:  Review of Systems   Constitutional:  Positive for unexpected weight change. Negative for chills and fever.   HENT:  Positive for ear pain and hearing loss. Negative for facial swelling.    Musculoskeletal:  Positive for neck pain (cervical disease).   Skin:  Negative for color change and wound.   Neurological:  Negative for facial asymmetry and headaches.   Hematological:  Positive for adenopathy. Does not bruise/bleed easily.   Psychiatric/Behavioral:  Negative for agitation.        Past History:  Past Medical History:   Diagnosis Date    BCC (basal cell carcinoma)     scalp    Melanoma     back     Past Surgical History:   Procedure Laterality Date    GALLBLADDER SURGERY      REPLACEMENT TOTAL KNEE BILATERAL      SKIN CANCER EXCISION      scalp    SKIN CANCER EXCISION      melanoma back     History reviewed. No pertinent family history.  Social History     Tobacco Use    Smoking status: Never    Smokeless tobacco: Never   Substance Use Topics    Alcohol use: No     Allergies:  Clindamycin/lincomycin, Cephalexin, Penicillin g, and Tetracycline    Current Outpatient Medications:     amLODIPine (NORVASC) 5 MG tablet, Take 1 tablet by mouth Daily., Disp: , Rfl:     esomeprazole (nexIUM) 40  MG capsule, Take 1 capsule by mouth Daily., Disp: , Rfl:       Objective     Vital Signs:  Temp:  [97.6 °F (36.4 °C)] 97.6 °F (36.4 °C)  Heart Rate:  [80] 80  BP: (134)/(77) 134/77    Physical Exam:  Physical Exam  Vitals reviewed.   Constitutional:       General: He is not in acute distress.     Appearance: He is well-developed.   HENT:      Head: Normocephalic and atraumatic.      Right Ear: External ear normal.      Left Ear: External ear normal.      Mouth/Throat:      Lips: Pink.   Eyes:      General: Lids are normal.   Neck:     Pulmonary:      Effort: Pulmonary effort is normal. No respiratory distress.      Breath sounds: No stridor.   Musculoskeletal:      Cervical back: Neck supple.   Neurological:      Mental Status: He is alert and oriented to person, place, and time.   Psychiatric:         Mood and Affect: Mood normal.         Speech: Speech normal.         Behavior: Behavior normal. Behavior is cooperative.         Results Review:               Assessment   Assessment:  1. Fibrolipoma of skin    2. Spitting suture, initial encounter          Plan   Plan:    A spitting suture was removed.  Otherwise, it is healing well.  He will restart mupirocin for 5 days.    In order to optimize wound healing, it is beneficial to protect the incisions from sunlight for the first year after the procedure.  Sunlight exposure may cause a brown-red discoloration to the incisions, making them more obvious.  Use a sunscreen with titanium dioxide and/or zinc oxide as the active ingredient(s).  Utilize an SPF factor of at least 15.    Use an OTC silicone-based wound cream to the incision daily to optimize wound healing.    He was instructed to call or return should any problems arise prior to next office visit.      No orders of the defined types were placed in this encounter.    There are no Patient Instructions on file for this visit.  Return in about 3 months (around 3/18/2024), or if symptoms worsen or fail to improve,  for Recheck.    My findings and recommendations were discussed and questions were answered.     Crystal Zambrano, APRN  12/18/23  12:06 CST

## 2024-03-18 ENCOUNTER — OFFICE VISIT (OUTPATIENT)
Age: 89
End: 2024-03-18
Payer: MEDICARE

## 2024-03-18 VITALS
RESPIRATION RATE: 20 BRPM | SYSTOLIC BLOOD PRESSURE: 146 MMHG | WEIGHT: 210 LBS | HEART RATE: 82 BPM | DIASTOLIC BLOOD PRESSURE: 84 MMHG | BODY MASS INDEX: 31.1 KG/M2 | TEMPERATURE: 98 F | HEIGHT: 69 IN

## 2024-03-18 DIAGNOSIS — D17.30 FIBROLIPOMA OF SKIN: Primary | ICD-10-CM

## 2024-03-18 PROCEDURE — 1159F MED LIST DOCD IN RCRD: CPT | Performed by: NURSE PRACTITIONER

## 2024-03-18 PROCEDURE — 1160F RVW MEDS BY RX/DR IN RCRD: CPT | Performed by: NURSE PRACTITIONER

## 2024-03-18 PROCEDURE — 99213 OFFICE O/P EST LOW 20 MIN: CPT | Performed by: NURSE PRACTITIONER
